# Patient Record
Sex: MALE | Race: WHITE | Employment: FULL TIME | URBAN - METROPOLITAN AREA
[De-identification: names, ages, dates, MRNs, and addresses within clinical notes are randomized per-mention and may not be internally consistent; named-entity substitution may affect disease eponyms.]

---

## 2019-03-26 LAB
CHOLESTEROL, TOTAL: 175 MG/DL
CHOLESTEROL/HDL RATIO: ABNORMAL
HDLC SERPL-MCNC: 40 MG/DL (ref 35–70)
HIV AG/AB: NORMAL
LDL CHOLESTEROL CALCULATED: 115 MG/DL (ref 0–160)
NONHDLC SERPL-MCNC: ABNORMAL MG/DL
TRIGL SERPL-MCNC: 102 MG/DL
VLDLC SERPL CALC-MCNC: 20 MG/DL

## 2022-02-24 ENCOUNTER — HOSPITAL ENCOUNTER (OUTPATIENT)
Dept: GENERAL RADIOLOGY | Age: 62
Discharge: HOME | End: 2022-02-24
Payer: MEDICAID

## 2022-02-24 ENCOUNTER — OFFICE VISIT (OUTPATIENT)
Dept: FAMILY MEDICINE CLINIC | Age: 62
End: 2022-02-24
Payer: MEDICAID

## 2022-02-24 ENCOUNTER — HOSPITAL ENCOUNTER (OUTPATIENT)
Age: 62
Discharge: HOME | End: 2022-02-24
Payer: MEDICAID

## 2022-02-24 VITALS
OXYGEN SATURATION: 95 % | SYSTOLIC BLOOD PRESSURE: 112 MMHG | BODY MASS INDEX: 21.1 KG/M2 | WEIGHT: 150.7 LBS | HEIGHT: 71 IN | DIASTOLIC BLOOD PRESSURE: 62 MMHG | HEART RATE: 85 BPM

## 2022-02-24 DIAGNOSIS — Z11.59 NEED FOR HEPATITIS C SCREENING TEST: ICD-10-CM

## 2022-02-24 DIAGNOSIS — M25.532 LEFT WRIST PAIN: ICD-10-CM

## 2022-02-24 DIAGNOSIS — G47.00 INSOMNIA, UNSPECIFIED TYPE: ICD-10-CM

## 2022-02-24 DIAGNOSIS — F17.210 SMOKING GREATER THAN 40 PACK YEARS: ICD-10-CM

## 2022-02-24 DIAGNOSIS — G25.81 RESTLESS LEGS SYNDROME (RLS): ICD-10-CM

## 2022-02-24 DIAGNOSIS — Z76.89 ENCOUNTER TO ESTABLISH CARE: Primary | ICD-10-CM

## 2022-02-24 DIAGNOSIS — Z13.220 LIPID SCREENING: ICD-10-CM

## 2022-02-24 DIAGNOSIS — G89.29 CHRONIC LOW BACK PAIN WITHOUT SCIATICA, UNSPECIFIED BACK PAIN LATERALITY: ICD-10-CM

## 2022-02-24 DIAGNOSIS — Z11.4 SCREENING FOR HUMAN IMMUNODEFICIENCY VIRUS: ICD-10-CM

## 2022-02-24 DIAGNOSIS — M54.50 CHRONIC LOW BACK PAIN WITHOUT SCIATICA, UNSPECIFIED BACK PAIN LATERALITY: ICD-10-CM

## 2022-02-24 DIAGNOSIS — F19.11 HISTORY OF DRUG ABUSE IN REMISSION (HCC): ICD-10-CM

## 2022-02-24 PROCEDURE — G8427 DOCREV CUR MEDS BY ELIG CLIN: HCPCS | Performed by: PHYSICIAN ASSISTANT

## 2022-02-24 PROCEDURE — 4004F PT TOBACCO SCREEN RCVD TLK: CPT | Performed by: PHYSICIAN ASSISTANT

## 2022-02-24 PROCEDURE — 73110 X-RAY EXAM OF WRIST: CPT

## 2022-02-24 PROCEDURE — 3017F COLORECTAL CA SCREEN DOC REV: CPT | Performed by: PHYSICIAN ASSISTANT

## 2022-02-24 PROCEDURE — G8420 CALC BMI NORM PARAMETERS: HCPCS | Performed by: PHYSICIAN ASSISTANT

## 2022-02-24 PROCEDURE — G8484 FLU IMMUNIZE NO ADMIN: HCPCS | Performed by: PHYSICIAN ASSISTANT

## 2022-02-24 PROCEDURE — 99204 OFFICE O/P NEW MOD 45 MIN: CPT | Performed by: PHYSICIAN ASSISTANT

## 2022-02-24 RX ORDER — ROPINIROLE 0.5 MG/1
TABLET, FILM COATED ORAL
Qty: 60 TABLET | Refills: 0 | Status: SHIPPED | OUTPATIENT
Start: 2022-02-24 | End: 2022-03-21 | Stop reason: SDUPTHER

## 2022-02-24 RX ORDER — BACLOFEN 5 MG/1
TABLET ORAL
Qty: 30 TABLET | Refills: 1 | Status: SHIPPED | OUTPATIENT
Start: 2022-02-24 | End: 2022-03-21 | Stop reason: SDUPTHER

## 2022-02-24 RX ORDER — NAPROXEN SODIUM 220 MG
220 TABLET ORAL 2 TIMES DAILY WITH MEALS
COMMUNITY

## 2022-02-24 ASSESSMENT — PATIENT HEALTH QUESTIONNAIRE - PHQ9
SUM OF ALL RESPONSES TO PHQ9 QUESTIONS 1 & 2: 0
SUM OF ALL RESPONSES TO PHQ QUESTIONS 1-9: 0
2. FEELING DOWN, DEPRESSED OR HOPELESS: 0
1. LITTLE INTEREST OR PLEASURE IN DOING THINGS: 0

## 2022-02-24 NOTE — PROGRESS NOTES
2/24/2022    Franca Jg    Chief Complaint   Patient presents with   Alvia Brunner Doctor       HPI  History obtained from the patient. Angie Booth is a 64 y.o. male who presents today for establishment as a new patient. The patient's last PCP was Dr. Renan Bertrand in [place]. Patient moved to [facility] from [place] about 1 month ago (January 2022). He moved in with his daughter. Medical history is significant for broken ribs last year and he broke ribs about a year before that. Surgical history significant for tonsillectomy. Patient is self-employed as a . He does a lot of painting. Patient is a current smoker, 1 ppd X 46 years, started at age 13. Patient admits alcohol use, \"maybe 5 beers per week, I don't drink to get omebriated anymore. \" Patient admits sexual activity. Patient admits \"25 years of smoking crack cocaine. And I retired about 1 month ago. I feel better. [place]'s better. Now I feel the pain, though. ... Used to do acid, speed. 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 \" Family history significant for father passed away at 58 from lung cancer, mother is healthy. No colon cancer in his family. NKDA. Patient sees the following specialists: None. The patient's pharmacy is [facility].    History of Drug Abuse - Patient stopped doing crack 1 month ago. He had been consistently doing drugs for 25 years. He used to do acid and speed, as well. Chronic Pain - Patient complains of pain in his left wrist X many years. He injured it 30 years ago. Patient admits 20+ years of back pain. Pain is worst by the end of the day after he's worked long hours. Restless Leg Syndrome - Patient was treated for this before with an effective medication, but cannot remember the name of this. Patient also admits trouble falling asleep. He only sleeps about 3 hours per night. He drinks 3 cups of coffee in the morning and 3 cans of Mt. Dew daily.      Health Maintenance - Patient's care gaps include COLON CA SCREEN, LUNG CANCER SCREEN. Patient refuses cancer screenings, stating that he wouldn't want to know if he had cancer. REVIEW OF SYMPTOMS  Review of Systems   Constitutional: Negative for chills and fever. Respiratory: Negative for cough and shortness of breath. Gastrointestinal: Negative for diarrhea and vomiting. PAST MEDICAL HISTORY  History reviewed. No pertinent past medical history. FAMILY HISTORY  History reviewed. No pertinent family history. SOCIAL HISTORY  Social History     Socioeconomic History    Marital status:      Spouse name: None    Number of children: None    Years of education: None    Highest education level: None   Occupational History    None   Tobacco Use    Smoking status: Current Every Day Smoker     Packs/day: 1.00     Years: 46.00     Pack years: 46.00     Types: Cigarettes     Start date: 11/14/1975    Smokeless tobacco: Never Used    Tobacco comment: Patient started smoking at age 13   Substance and Sexual Activity    Alcohol use: Yes     Alcohol/week: 1.0 - 2.0 standard drink     Types: 1 - 2 Cans of beer per week    Drug use: Not Currently     Types: Cocaine     Comment: pt reports stopped smoking crack 1 month ago    Sexual activity: None   Other Topics Concern    None   Social History Narrative    None     Social Determinants of Health     Financial Resource Strain:     Difficulty of Paying Living Expenses: Not on file   Food Insecurity:     Worried About Running Out of Food in the Last Year: Not on file    Nemesio of Food in the Last Year: Not on file   Transportation Needs:     Lack of Transportation (Medical): Not on file    Lack of Transportation (Non-Medical):  Not on file   Physical Activity:     Days of Exercise per Week: Not on file    Minutes of Exercise per Session: Not on file   Stress:     Feeling of Stress : Not on file   Social Connections:     Frequency of Communication with Friends and Family: Not on file    Frequency of Social Gatherings with Friends and Family: Not on file    Attends Synagogue Services: Not on file    Active Member of Clubs or Organizations: Not on file    Attends Club or Organization Meetings: Not on file    Marital Status: Not on file   Intimate Partner Violence:     Fear of Current or Ex-Partner: Not on file    Emotionally Abused: Not on file    Physically Abused: Not on file    Sexually Abused: Not on file   Housing Stability:     Unable to Pay for Housing in the Last Year: Not on file    Number of [place] in the Last Year: Not on file    Unstable Housing in the Last Year: Not on file        SURGICAL HISTORY  History reviewed. No pertinent surgical history. CURRENT MEDICATIONS  Current Outpatient Medications   Medication Sig Dispense Refill    naproxen sodium (ALEVE) 220 MG tablet Take 220 mg by mouth 2 times daily (with meals)      rOPINIRole (REQUIP) 0.5 MG tablet Take half tab nightly 1-3 hours before bedtime X 2 days. Then take 1 tab nightly X 7 days. Then take 2 tablets nightly. 60 tablet 0    Baclofen (LIORESAL) 5 MG tablet Take 1 or 2 tablets by mouth up to three times daily as needed for back pain. 30 tablet 1     No current facility-administered medications for this visit. ALLERGIES  No Known Allergies    RECENT LABS    No results found for: LABA1C  No results found for: EAG    No results found for: CHOL  No results found for: LDLCHOLESTEROL, LDLCALC    No results found for: WBC, HGB, HCT, MCV, PLT    PHYSICAL EXAM  /62   Pulse 85   Ht 5' 11\" (1.803 m)   Wt 150 lb 11.2 oz (68.4 kg)   SpO2 95%   BMI 21.02 kg/m²     Physical Exam  Constitutional:       Appearance: Normal appearance. HENT:      Head: Normocephalic and atraumatic. Eyes:      Comments: EOM grossly intact. Cardiovascular:      Rate and Rhythm: Normal rate and regular rhythm. Heart sounds: No murmur heard. No friction rub. No gallop.     Pulmonary:      Effort: Pulmonary effort is normal. Breath sounds: Normal breath sounds. No wheezing, rhonchi or rales. Musculoskeletal:      Comments: Patient has a mildly tender nodule of the dorsal aspect of his left wrist.    Skin:     General: Skin is warm and dry. Neurological:      Mental Status: He is alert and oriented to person, place, and time. Comments: Cranial nerves II-XII grossly intact   Psychiatric:         Mood and Affect: Mood normal.         Behavior: Behavior normal.         ASSESSMENT & PLAN  1. Encounter to establish care  - Comprehensive Metabolic Panel; Future  - CBC with Auto Differential; Future    2. History of drug abuse in remission [facility])  Recommended local resources such as [facility], but patient declined these. - Comprehensive Metabolic Panel; Future  - CBC with Auto Differential; Future  - Hepatitis C Antibody; Future  - HIV Screen; Future  - TSH with Reflex to FT4; Future    3. Chronic low back pain without sciatica, unspecified back pain laterality  Patient requests a prescription for pain medication, stating that ibuprofen is not covered anymore. Prescribed baclofen and referred the patient to pain management for evaluation.  - Amb External Referral To Pain Medicine  - Baclofen (LIORESAL) 5 MG tablet; Take 1 or 2 tablets by mouth up to three times daily as needed for back pain. Dispense: 30 tablet; Refill: 1    4. Left wrist pain  We will check x-ray and uric acid level. Referred the patient to orthopedics for evaluation.  - Uric Acid; Future  - XR WRIST LEFT (MIN 3 VIEWS); Future  - [place] MD, Orthopedic Surgery, [facility]    5. Restless legs syndrome (RLS)  Started the patient on Requip. Will check iron levels and blood work. - Comprehensive Metabolic Panel; Future  - CBC with Auto Differential; Future  - TSH with Reflex to FT4; Future  - rOPINIRole (REQUIP) 0.5 MG tablet; Take half tab nightly 1-3 hours before bedtime X 2 days. Then take 1 tab nightly X 7 days.  Then take 2 tablets nightly. Dispense: 60 tablet; Refill: 0  - Iron and TIBC; Future  - Ferritin; Future    6. Insomnia, unspecified type  We will address restless leg syndrome first to see if we can get control of insomnia by treating this. - Comprehensive Metabolic Panel; Future  - CBC with Auto Differential; Future  - TSH with Reflex to FT4; Future    7. Lipid screening  Health maintenance requirement. Patient is agreeable to screening.   - Lipid Panel; Future    8. Smoking greater than 40 pack years  Patient refuses lung cancer screening.  - Comprehensive Metabolic Panel; Future  - CBC with Auto Differential; Future    9. Need for hepatitis C screening test  Health maintenance requirement. Patient is agreeable to screening.   - Hepatitis C Antibody; Future    10. Screening for human immunodeficiency virus  Health maintenance requirement. Patient is agreeable to screening.   - HIV Screen; Future          Return in about 6 weeks (around 4/7/2022).             Electronically signed by Navya Kam PA-C on 2/24/2022

## 2022-02-28 NOTE — RESULT ENCOUNTER NOTE
Please call the patient and let them know that I reviewed the results of his wrist x-ray. It showed dislocation of 2 of the small bones in his wrist, the scaphoid and the lunate. We will see what orthopedics has to say.   If he has not already received a call from their office to get scheduled for an appointment, he can go ahead and call the number listed below to schedule this:    MD Armando   [address], [address], [address]   [phone]

## 2022-03-21 ENCOUNTER — OFFICE VISIT (OUTPATIENT)
Dept: FAMILY MEDICINE CLINIC | Age: 62
End: 2022-03-21
Payer: MEDICAID

## 2022-03-21 VITALS
BODY MASS INDEX: 21.99 KG/M2 | HEIGHT: 71 IN | OXYGEN SATURATION: 94 % | SYSTOLIC BLOOD PRESSURE: 122 MMHG | WEIGHT: 157.1 LBS | HEART RATE: 98 BPM | DIASTOLIC BLOOD PRESSURE: 60 MMHG

## 2022-03-21 DIAGNOSIS — H91.93 DECREASED HEARING OF BOTH EARS: ICD-10-CM

## 2022-03-21 DIAGNOSIS — L98.9 SKIN LESION OF SCALP: ICD-10-CM

## 2022-03-21 DIAGNOSIS — H61.23 BILATERAL IMPACTED CERUMEN: ICD-10-CM

## 2022-03-21 DIAGNOSIS — M54.50 CHRONIC LOW BACK PAIN WITHOUT SCIATICA, UNSPECIFIED BACK PAIN LATERALITY: ICD-10-CM

## 2022-03-21 DIAGNOSIS — G25.81 RESTLESS LEGS SYNDROME (RLS): ICD-10-CM

## 2022-03-21 DIAGNOSIS — G89.29 CHRONIC LOW BACK PAIN WITHOUT SCIATICA, UNSPECIFIED BACK PAIN LATERALITY: ICD-10-CM

## 2022-03-21 DIAGNOSIS — G47.00 INSOMNIA, UNSPECIFIED TYPE: Primary | ICD-10-CM

## 2022-03-21 PROCEDURE — 4004F PT TOBACCO SCREEN RCVD TLK: CPT | Performed by: PHYSICIAN ASSISTANT

## 2022-03-21 PROCEDURE — 99214 OFFICE O/P EST MOD 30 MIN: CPT | Performed by: PHYSICIAN ASSISTANT

## 2022-03-21 PROCEDURE — G8420 CALC BMI NORM PARAMETERS: HCPCS | Performed by: PHYSICIAN ASSISTANT

## 2022-03-21 PROCEDURE — G8427 DOCREV CUR MEDS BY ELIG CLIN: HCPCS | Performed by: PHYSICIAN ASSISTANT

## 2022-03-21 PROCEDURE — G8484 FLU IMMUNIZE NO ADMIN: HCPCS | Performed by: PHYSICIAN ASSISTANT

## 2022-03-21 PROCEDURE — 3017F COLORECTAL CA SCREEN DOC REV: CPT | Performed by: PHYSICIAN ASSISTANT

## 2022-03-21 RX ORDER — TRAZODONE HYDROCHLORIDE 50 MG/1
TABLET ORAL
Qty: 30 TABLET | Refills: 1 | Status: SHIPPED | OUTPATIENT
Start: 2022-03-21

## 2022-03-21 RX ORDER — ROPINIROLE 1 MG/1
TABLET, FILM COATED ORAL
Qty: 53 TABLET | Refills: 0 | Status: SHIPPED | OUTPATIENT
Start: 2022-03-21 | End: 2022-04-18

## 2022-03-21 RX ORDER — BACLOFEN 5 MG/1
TABLET ORAL
Qty: 30 TABLET | Refills: 1 | Status: SHIPPED | OUTPATIENT
Start: 2022-03-21

## 2022-03-21 NOTE — PROGRESS NOTES
3/21/2022    Yue Diamond    Chief Complaint   Patient presents with    Follow-up     lower back pain ongoing     Insomnia     pt states trouble falling asleep and staying asleep    Other     restless leg syndrome , pt wants to know if med can be increased    Hearing Problem     pt wants to know who he needs to see about getting hearing aids       HPI  History obtained from the patient. Camila Gay is a 64 y.o. male who presents today for one month follow up. Insomnia - Patient complains of insomnia. He states that he sometimes only gets 4 hours or less of sleep per night. He states that he lays awake with racing thoughts and has trouble falling asleep. Restless leg syndrome - Patient has seen improvement in his restless leg syndrome with the Requip, but he feels the dose is not strong enough. He is still struggling with restless leg symptoms    Decreased Hearing - started a long time ago, worked around a lot of loud machinery and equipment. Getting worse recently, has to listen to the tv loudly, and had trouble hearing grandkids. Dad had a history of hearing problems, would like to get some hearing aids to wear when watching tv and with family. Chronic Back Pain - Patient notes that he did experience some relief with the baclofen, and he requests a refill of this. Patient states that he does not want to see pain management. He states he will, \"deal with it. \"     Hx of drug abuse - Patient stopped doing crack in January, check how progress is going. current smoker 1 ppd x 45 years. Would be open to quitting but wants to try on his own without NRT or medication. Skin Lesion - Patient notes a lesion on his scalp which does not hurt but has been growing bigger and bigger recently. REVIEW OF SYMPTOMS  Review of Systems   Constitutional: Negative for chills and fever. Respiratory: Negative for cough and shortness of breath. Gastrointestinal: Negative for diarrhea and vomiting.      PAST MEDICAL HISTORY  No past medical history on file. FAMILY HISTORY  No family history on file. SOCIAL HISTORY  Social History     Socioeconomic History    Marital status:      Spouse name: Not on file    Number of children: Not on file    Years of education: Not on file    Highest education level: Not on file   Occupational History    Not on file   Tobacco Use    Smoking status: Current Every Day Smoker     Packs/day: 1.00     Years: 46.00     Pack years: 46.00     Types: Cigarettes     Start date: 11/14/1975    Smokeless tobacco: Never Used    Tobacco comment: Patient started smoking at age 13   Substance and Sexual Activity    Alcohol use: Yes     Alcohol/week: 1.0 - 2.0 standard drink     Types: 1 - 2 Cans of beer per week    Drug use: Not Currently     Types: Cocaine     Comment: pt reports stopped smoking crack 1 month ago    Sexual activity: Not on file   Other Topics Concern    Not on file   Social History Narrative    Not on file     Social Determinants of Health     Financial Resource Strain:     Difficulty of Paying Living Expenses: Not on file   Food Insecurity:     Worried About Running Out of Food in the Last Year: Not on file    Nemesio of Food in the Last Year: Not on file   Transportation Needs:     Lack of Transportation (Medical): Not on file    Lack of Transportation (Non-Medical):  Not on file   Physical Activity:     Days of Exercise per Week: Not on file    Minutes of Exercise per Session: Not on file   Stress:     Feeling of Stress : Not on file   Social Connections:     Frequency of Communication with Friends and Family: Not on file    Frequency of Social Gatherings with Friends and Family: Not on file    Attends Catholic Services: Not on file    Active Member of Clubs or Organizations: Not on file    Attends Club or Organization Meetings: Not on file    Marital Status: Not on file   Intimate Partner Violence:     Fear of Current or Ex-Partner: Not on file    Emotionally Abused: Not on file    Physically Abused: Not on file    Sexually Abused: Not on file   Housing Stability:     Unable to Pay for Housing in the Last Year: Not on file    Number of Places Lived in the Last Year: Not on file    Unstable Housing in the Last Year: Not on file        SURGICAL HISTORY  No past surgical history on file. CURRENT MEDICATIONS  Current Outpatient Medications   Medication Sig Dispense Refill    Baclofen (LIORESAL) 5 MG tablet Take 1 or 2 tablets by mouth up to three times daily as needed for back pain. 30 tablet 1    traZODone (DESYREL) 50 MG tablet Take 1 tablet nightly at bedtime. Try this for 2 weeks. Then if necessary, may take 2 tablets nightly. 30 tablet 1    rOPINIRole (REQUIP) 1 MG tablet Take 1.5 tablets by mouth nightly for 7 days, THEN 2 tablets nightly for 21 days. Take half tab nightly 1-3 hours before bedtime X 2 days. Then take 1 tab nightly X 7 days. Then take 2 tablets nightly. . 53 tablet 0    naproxen sodium (ALEVE) 220 MG tablet Take 220 mg by mouth 2 times daily (with meals) (Patient not taking: Reported on 3/21/2022)       No current facility-administered medications for this visit. ALLERGIES  No Known Allergies    RECENT LABS    No results found for: LABA1C  No results found for: EAG    No results found for: CHOL  No results found for: LDLCHOLESTEROL, LDLCALC    No results found for: WBC, HGB, HCT, MCV, PLT    PHYSICAL EXAM  /60   Pulse 98   Ht 5' 11\" (1.803 m)   Wt 157 lb 1.6 oz (71.3 kg)   SpO2 94%   BMI 21.91 kg/m²     Physical Exam  Constitutional:       Appearance: Normal appearance. HENT:      Head: Normocephalic and atraumatic. Right Ear: There is impacted cerumen. Left Ear: There is impacted cerumen. Eyes:      Comments: EOM grossly intact. Cardiovascular:      Rate and Rhythm: Normal rate and regular rhythm. Heart sounds: No murmur heard. No friction rub. No gallop.     Pulmonary:      Effort: 1    7. Skin lesion of scalp  Refer the patient to dermatology for removal.  - Amb External Referral To Dermatology      Return in about 4 weeks (around 4/18/2022).             Electronically signed by Jared Combs PA-C on 3/21/2022

## 2022-05-02 DIAGNOSIS — G47.00 INSOMNIA, UNSPECIFIED TYPE: ICD-10-CM

## 2022-05-02 RX ORDER — TRAZODONE HYDROCHLORIDE 50 MG/1
TABLET ORAL
Qty: 30 TABLET | Refills: 1 | OUTPATIENT
Start: 2022-05-02

## 2022-09-07 ENCOUNTER — COMMUNITY OUTREACH (OUTPATIENT)
Dept: FAMILY MEDICINE CLINIC | Age: 62
End: 2022-09-07

## 2022-09-07 NOTE — PROGRESS NOTES
Patient's HM shows they are overdue for HIV Screen, Hep C Screen and Colorectal Screening. Care Everywhere and  files searched. HM updated with HIV Screen. Pt declined colonoscopy.

## 2022-12-16 DIAGNOSIS — G25.81 RESTLESS LEGS SYNDROME (RLS): ICD-10-CM

## 2022-12-16 DIAGNOSIS — M54.50 CHRONIC LOW BACK PAIN WITHOUT SCIATICA, UNSPECIFIED BACK PAIN LATERALITY: ICD-10-CM

## 2022-12-16 DIAGNOSIS — G47.00 INSOMNIA, UNSPECIFIED TYPE: ICD-10-CM

## 2022-12-16 DIAGNOSIS — G89.29 CHRONIC LOW BACK PAIN WITHOUT SCIATICA, UNSPECIFIED BACK PAIN LATERALITY: ICD-10-CM

## 2022-12-19 ENCOUNTER — TELEPHONE (OUTPATIENT)
Dept: FAMILY MEDICINE CLINIC | Age: 62
End: 2022-12-19

## 2022-12-19 DIAGNOSIS — G25.81 RESTLESS LEGS SYNDROME (RLS): ICD-10-CM

## 2022-12-19 RX ORDER — ROPINIROLE 1 MG/1
2 TABLET, FILM COATED ORAL NIGHTLY
Qty: 180 TABLET | Refills: 0 | Status: SHIPPED | OUTPATIENT
Start: 2022-12-19 | End: 2023-03-19

## 2022-12-19 RX ORDER — ROPINIROLE 1 MG/1
TABLET, FILM COATED ORAL
Qty: 53 TABLET | Refills: 0 | Status: SHIPPED | OUTPATIENT
Start: 2022-12-19 | End: 2022-12-19 | Stop reason: SDUPTHER

## 2022-12-19 RX ORDER — BACLOFEN 5 MG/1
TABLET ORAL
Qty: 30 TABLET | Refills: 1 | Status: SHIPPED | OUTPATIENT
Start: 2022-12-19

## 2022-12-19 RX ORDER — TRAZODONE HYDROCHLORIDE 50 MG/1
TABLET ORAL
Qty: 30 TABLET | Refills: 1 | Status: SHIPPED | OUTPATIENT
Start: 2022-12-19

## 2022-12-19 NOTE — TELEPHONE ENCOUNTER
Requested Prescriptions     Signed Prescriptions Disp Refills    rOPINIRole (REQUIP) 1 MG tablet 180 tablet 0     Sig: Take 2 tablets by mouth nightly     Authorizing Provider: Jess Pacheco     Ordering User: Marc Blum

## 2023-02-17 DIAGNOSIS — G47.00 INSOMNIA, UNSPECIFIED TYPE: ICD-10-CM

## 2023-02-17 RX ORDER — TRAZODONE HYDROCHLORIDE 50 MG/1
TABLET ORAL
Qty: 60 TABLET | Refills: 1 | Status: SHIPPED | OUTPATIENT
Start: 2023-02-17

## 2023-02-20 ENCOUNTER — OFFICE VISIT (OUTPATIENT)
Dept: FAMILY MEDICINE CLINIC | Age: 63
End: 2023-02-20
Payer: MEDICAID

## 2023-02-20 VITALS
BODY MASS INDEX: 21.42 KG/M2 | DIASTOLIC BLOOD PRESSURE: 62 MMHG | OXYGEN SATURATION: 96 % | HEIGHT: 71 IN | WEIGHT: 153 LBS | HEART RATE: 76 BPM | SYSTOLIC BLOOD PRESSURE: 100 MMHG

## 2023-02-20 DIAGNOSIS — M25.512 CHRONIC PAIN OF BOTH SHOULDERS: Primary | ICD-10-CM

## 2023-02-20 DIAGNOSIS — R20.0 NUMBNESS AND TINGLING IN BOTH HANDS: ICD-10-CM

## 2023-02-20 DIAGNOSIS — L98.9 SKIN LESION OF SCALP: ICD-10-CM

## 2023-02-20 DIAGNOSIS — M54.50 ACUTE BILATERAL LOW BACK PAIN WITHOUT SCIATICA: ICD-10-CM

## 2023-02-20 DIAGNOSIS — G89.29 CHRONIC PAIN OF BOTH SHOULDERS: Primary | ICD-10-CM

## 2023-02-20 DIAGNOSIS — G47.00 INSOMNIA, UNSPECIFIED TYPE: ICD-10-CM

## 2023-02-20 DIAGNOSIS — M25.511 CHRONIC PAIN OF BOTH SHOULDERS: Primary | ICD-10-CM

## 2023-02-20 DIAGNOSIS — R20.2 NUMBNESS AND TINGLING IN BOTH HANDS: ICD-10-CM

## 2023-02-20 PROBLEM — G56.02 CARPAL TUNNEL SYNDROME OF LEFT WRIST: Status: ACTIVE | Noted: 2021-06-18

## 2023-02-20 PROBLEM — H90.3 SENSORINEURAL HEARING LOSS (SNHL) OF BOTH EARS: Status: ACTIVE | Noted: 2021-06-18

## 2023-02-20 PROBLEM — S14.3XXS: Status: ACTIVE | Noted: 2021-06-18

## 2023-02-20 PROBLEM — S22.31XD CLOSED FRACTURE OF ONE RIB OF RIGHT SIDE WITH ROUTINE HEALING: Status: ACTIVE | Noted: 2021-06-18

## 2023-02-20 PROCEDURE — G8427 DOCREV CUR MEDS BY ELIG CLIN: HCPCS

## 2023-02-20 PROCEDURE — G8484 FLU IMMUNIZE NO ADMIN: HCPCS

## 2023-02-20 PROCEDURE — G8420 CALC BMI NORM PARAMETERS: HCPCS

## 2023-02-20 PROCEDURE — 3017F COLORECTAL CA SCREEN DOC REV: CPT

## 2023-02-20 PROCEDURE — 4004F PT TOBACCO SCREEN RCVD TLK: CPT

## 2023-02-20 PROCEDURE — 99214 OFFICE O/P EST MOD 30 MIN: CPT

## 2023-02-20 RX ORDER — METHOCARBAMOL 500 MG/1
500 TABLET, FILM COATED ORAL 4 TIMES DAILY
Qty: 80 TABLET | Refills: 1 | Status: SHIPPED | OUTPATIENT
Start: 2023-02-20 | End: 2023-04-01

## 2023-02-20 RX ORDER — METHYLPREDNISOLONE 4 MG/1
TABLET ORAL
Qty: 1 KIT | Refills: 0 | Status: SHIPPED | OUTPATIENT
Start: 2023-02-20

## 2023-02-20 RX ORDER — TRAZODONE HYDROCHLORIDE 100 MG/1
200 TABLET ORAL NIGHTLY
Qty: 90 TABLET | Refills: 1 | Status: SHIPPED | OUTPATIENT
Start: 2023-02-20

## 2023-02-20 ASSESSMENT — PATIENT HEALTH QUESTIONNAIRE - PHQ9
SUM OF ALL RESPONSES TO PHQ QUESTIONS 1-9: 0
SUM OF ALL RESPONSES TO PHQ9 QUESTIONS 1 & 2: 0
SUM OF ALL RESPONSES TO PHQ QUESTIONS 1-9: 0
2. FEELING DOWN, DEPRESSED OR HOPELESS: 0
1. LITTLE INTEREST OR PLEASURE IN DOING THINGS: 0
SUM OF ALL RESPONSES TO PHQ QUESTIONS 1-9: 0
SUM OF ALL RESPONSES TO PHQ QUESTIONS 1-9: 0

## 2023-02-20 ASSESSMENT — ENCOUNTER SYMPTOMS
BACK PAIN: 1
SHORTNESS OF BREATH: 0

## 2023-02-20 NOTE — PROGRESS NOTES
2/20/2023    Alver Dura    Chief Complaint   Patient presents with    Hip Pain     - bilateral hip pain, more so left hip pain. Chronic & getting worse  - numbness bilateral arms & fingers. Chronic & getting worse. - bilateral shoulder pain while raising arms. Chronic & getting worse. Pt has a history bilateral shoulder injuries. Pt currently takes aleve which helps shoulder pain temporarily. Doesn't help hip pain. HPI  History was obtained from patient. May Neville is a pleasant 58 y.o. male who presents today for concerns of worsening pain in several areas. Low back pain: chronic/worsening. L>R. Bilateral SI pain. Worsening since last year. Denies numbness and tingling down his BLE. Working and walking make the pain worse- rest does help. Bilateral Shoulder Pain: Chronic/worsening. Worse when lifting arms overhead. He sleeps on his stomach with his arms folded underneath of his head. History of several injuries. 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 Bilateral arm/hand numbness: chronic/worsening. History of left carpal tunnel syndrome. Small fingers are the worst- these are numb while he is sitting in the exam chair. He is a  and works as a local . 1. Chronic pain of both shoulders    2. Acute bilateral low back pain without sciatica    3. Numbness and tingling in both hands    4. Insomnia, unspecified type    5. Skin lesion of scalp             REVIEW OF SYMPTOMS    Review of Systems   Respiratory:  Negative for shortness of breath. Cardiovascular:  Negative for chest pain and palpitations. Musculoskeletal:  Positive for arthralgias (Bilateral shoulders), back pain (Chronic- low back) and myalgias (Bilateral forearms into hands with neuropathic symptoms). Psychiatric/Behavioral:  Positive for sleep disturbance. PAST MEDICAL HISTORY  History reviewed. No pertinent past medical history. FAMILY HISTORY  History reviewed.  No pertinent family history. SOCIAL HISTORY  Social History     Socioeconomic History    Marital status:      Spouse name: None    Number of children: None    Years of education: None    Highest education level: None   Tobacco Use    Smoking status: Every Day     Packs/day: 1.00     Years: 46.00     Pack years: 46.00     Types: Cigarettes     Start date: 11/14/1975    Smokeless tobacco: Never    Tobacco comments:     Patient started smoking at age 13   Substance and Sexual Activity    Alcohol use: Yes     Alcohol/week: 1.0 - 2.0 standard drink     Types: 1 - 2 Cans of beer per week    Drug use: Not Currently     Types: Cocaine     Comment: pt reports stopped smoking crack 1 month ago        SURGICAL HISTORY  History reviewed. No pertinent surgical history. CURRENT MEDICATIONS  Current Outpatient Medications   Medication Sig Dispense Refill    methocarbamol (ROBAXIN) 500 MG tablet Take 1 tablet by mouth 4 times daily 80 tablet 1    traZODone (DESYREL) 100 MG tablet Take 2 tablets by mouth nightly Take 2 tablets nightly at bedtime. 90 tablet 1    methylPREDNISolone (MEDROL DOSEPACK) 4 MG tablet Take by mouth. 1 kit 0    rOPINIRole (REQUIP) 1 MG tablet Take 2 tablets by mouth nightly 180 tablet 0    naproxen sodium (ANAPROX) 220 MG tablet Take 220 mg by mouth 2 times daily (with meals)       No current facility-administered medications for this visit. ALLERGIES  No Known Allergies    PHYSICAL EXAM    /62 (Site: Right Upper Arm, Position: Sitting, Cuff Size: Small Adult)   Pulse 76   Ht 5' 11\" (1.803 m)   Wt 153 lb (69.4 kg)   SpO2 96%   BMI 21.34 kg/m²     Physical Exam  Vitals and nursing note reviewed. Constitutional:       General: He is not in acute distress. Appearance: Normal appearance. He is normal weight. HENT:      Head: Normocephalic and atraumatic.    Eyes:      Conjunctiva/sclera: Conjunctivae normal.   Pulmonary:      Effort: Pulmonary effort is normal. Musculoskeletal:      Right shoulder: Tenderness and bony tenderness present. Decreased range of motion. Normal strength. Left shoulder: Tenderness and bony tenderness present. Decreased range of motion. Normal strength. Right elbow: Normal range of motion. Tenderness present in medial epicondyle and lateral epicondyle. Left elbow: Normal range of motion. Tenderness present in medial epicondyle and lateral epicondyle. Right forearm: Tenderness present. Left forearm: Tenderness present. Right hand: Decreased strength. Left hand: Decreased strength. Arms:       Lumbar back: Spasms, tenderness and bony tenderness present. Decreased range of motion. Back:       Comments: (+) Tinel, Phalen testing bilaterally. (+) painful arc, resisted shoulder flexion, kristy brandt bilaterally. Neurological:      Mental Status: He is alert and oriented to person, place, and time. Mental status is at baseline. ASSESSMENT & PLAN    Mayur Rosas was seen today for hip pain. Diagnoses and all orders for this visit:    Chronic pain of both shoulders  Will obtain xrays to assess for degree of degeneration in shoulder joints. Side effect profile reviewed with patient- discouraged driving and physically risky activities while taking Robaxin. Will trial physical therapy and consider additional imaging if ineffective. -     XR SHOULDER LEFT (MIN 2 VIEWS); Future  -     XR SHOULDER RIGHT (MIN 2 VIEWS); Future  -     methocarbamol (ROBAXIN) 500 MG tablet; Take 1 tablet by mouth 4 times daily  -     [facility]  -     methylPREDNISolone (MEDROL DOSEPACK) 4 MG tablet; Take by mouth. Acute bilateral low back pain without sciatica  As above. -     XR LUMBAR SPINE (2-3 VIEWS); Future  -     methocarbamol (ROBAXIN) 500 MG tablet;  Take 1 tablet by mouth 4 times daily  -     [facility]  -     methylPREDNISolone (MEDROL DOSEPACK) 4 MG tablet; Take by mouth. Numbness and tingling in both hands  As above, will refer for EMG testing and consider ortho referral.   -     Alex Samson MD, Neurology, [place]  -     methocarbamol (ROBAXIN) 500 MG tablet; Take 1 tablet by mouth 4 times daily  -     [facility] - [place]  -     methylPREDNISolone (MEDROL DOSEPACK) 4 MG tablet; Take by mouth. Insomnia, unspecified type  Will trial dose increase as 100mg was ineffective. -     traZODone (DESYREL) 100 MG tablet; Take 2 tablets by mouth nightly Take 2 tablets nightly at bedtime. Skin lesion of scalp  Previous referral did not accept his insurance. -     Amb External Referral To Dermatology    Return in about 6 months (around 8/20/2023).          Electronically signed by POLO Valdivia CNP on 2/20/2023

## 2023-02-20 NOTE — PATIENT INSTRUCTIONS
[facility] Hours  [address] [place], [address] [facility]    Hours  Monday-Friday 7am-5pm  Saturday  8am-12pm      [facility] Neurology - Loletta Goodell, MD  [address] [facility]  [place] [address], [place] [address]  Phone: [phone] [facility]  [address], [place] [address] [phone] SAMIR Spaulding  [facility]  [address]   [place], [address]  Phone - [phone]

## 2023-03-28 DIAGNOSIS — G47.00 INSOMNIA, UNSPECIFIED TYPE: Primary | ICD-10-CM

## 2023-03-28 DIAGNOSIS — G25.81 RESTLESS LEGS SYNDROME (RLS): ICD-10-CM

## 2023-03-28 RX ORDER — ROPINIROLE 1 MG/1
2 TABLET, FILM COATED ORAL NIGHTLY
Qty: 180 TABLET | Refills: 0 | Status: SHIPPED | OUTPATIENT
Start: 2023-03-28 | End: 2023-06-26

## 2023-03-28 RX ORDER — DOXEPIN HYDROCHLORIDE 10 MG/1
10 CAPSULE ORAL NIGHTLY
Qty: 30 CAPSULE | Refills: 1 | Status: SHIPPED | OUTPATIENT
Start: 2023-03-28 | End: 2023-05-27

## 2023-03-28 NOTE — TELEPHONE ENCOUNTER
Please call the patient and let him know I sent an alternative option for him to try called doxepin. If he has dry mouth or any other issues with his medication, please let us know.

## 2023-03-28 NOTE — TELEPHONE ENCOUNTER
2-20-23  JEFFERSON 8-21-23  [facility]     The Trazodone causes very dry mouth is there another medication that is effective as Trazodone with out the dry mouth. Call patient and advise .

## 2023-05-31 DIAGNOSIS — G47.00 INSOMNIA, UNSPECIFIED TYPE: ICD-10-CM

## 2023-05-31 DIAGNOSIS — G25.81 RESTLESS LEGS SYNDROME (RLS): ICD-10-CM

## 2023-05-31 RX ORDER — TRAZODONE HYDROCHLORIDE 100 MG/1
TABLET ORAL
Qty: 90 TABLET | Refills: 0 | OUTPATIENT
Start: 2023-05-31

## 2023-05-31 RX ORDER — ROPINIROLE 1 MG/1
2 TABLET, FILM COATED ORAL NIGHTLY
Qty: 180 TABLET | Refills: 0 | OUTPATIENT
Start: 2023-05-31 | End: 2023-08-29

## 2023-06-01 DIAGNOSIS — G47.00 INSOMNIA, UNSPECIFIED TYPE: ICD-10-CM

## 2023-06-01 DIAGNOSIS — G25.81 RESTLESS LEGS SYNDROME (RLS): ICD-10-CM

## 2023-06-01 RX ORDER — ROPINIROLE 1 MG/1
2 TABLET, FILM COATED ORAL NIGHTLY
Qty: 180 TABLET | Refills: 0 | Status: SHIPPED | OUTPATIENT
Start: 2023-06-01 | End: 2023-08-30

## 2023-06-01 RX ORDER — TRAZODONE HYDROCHLORIDE 100 MG/1
200 TABLET ORAL NIGHTLY
Qty: 180 TABLET | Refills: 0 | Status: SHIPPED | OUTPATIENT
Start: 2023-06-01

## 2023-08-17 DIAGNOSIS — G25.81 RESTLESS LEGS SYNDROME (RLS): ICD-10-CM

## 2023-08-17 DIAGNOSIS — G47.00 INSOMNIA, UNSPECIFIED TYPE: ICD-10-CM

## 2023-08-18 RX ORDER — ROPINIROLE 2 MG/1
2 TABLET, FILM COATED ORAL NIGHTLY
Qty: 90 TABLET | Refills: 1 | Status: SHIPPED | OUTPATIENT
Start: 2023-08-18 | End: 2024-02-14

## 2023-08-18 RX ORDER — TRAZODONE HYDROCHLORIDE 100 MG/1
200 TABLET ORAL NIGHTLY
Qty: 180 TABLET | Refills: 1 | Status: SHIPPED | OUTPATIENT
Start: 2023-08-18 | End: 2024-02-14

## 2023-11-10 DIAGNOSIS — G25.81 RESTLESS LEGS SYNDROME (RLS): ICD-10-CM

## 2023-11-10 NOTE — TELEPHONE ENCOUNTER
Re: rOPINIRole (REQUIP) 2 MG tablet     Patient states he is taking 2 -3 pills per day and now is only has 10 pills left. Could he get a refill of this--he does not want to be without this.

## 2023-11-13 RX ORDER — ROPINIROLE 2 MG/1
2 TABLET, FILM COATED ORAL NIGHTLY
Qty: 90 TABLET | Refills: 1 | Status: SHIPPED | OUTPATIENT
Start: 2023-11-13 | End: 2024-05-11

## 2024-01-04 ENCOUNTER — TELEPHONE (OUTPATIENT)
Dept: FAMILY MEDICINE CLINIC | Age: 64
End: 2024-01-04

## 2024-01-04 NOTE — TELEPHONE ENCOUNTER
Patient said the Ropinirole is not working and needs to speak with someone about someone regarding it.

## 2024-01-05 DIAGNOSIS — G25.81 RESTLESS LEGS SYNDROME (RLS): ICD-10-CM

## 2024-01-05 NOTE — TELEPHONE ENCOUNTER
To Cody-    Patient said the rOPINIRole (REQUIP) 2 MG tablet   Is not really working for the RLS----he has to take at least 3 or 4 pills per day to get his legs calmed down.  He has to rely on his insurance company to provide transportation for him so since they brought him late to the appt, he could not be seen ----his next appt is on 2/5/24------could he have one more month of the Ropinirole called in for 90 pills for one month until he can discuss with you what other medicine he can be prescribed.     Please call him to let him know if you will do this.      [facility] in [place]

## 2024-01-08 NOTE — TELEPHONE ENCOUNTER
It looks like the patient has an appointment scheduled in February to discuss this, so we can talk about options further at that time.  Would he like to try increasing his dose in the meantime? Or if he's wanting to try a different medication, we will wait and discuss that at his appointment

## 2024-01-09 RX ORDER — ROPINIROLE 2 MG/1
2 TABLET, FILM COATED ORAL NIGHTLY
Qty: 30 TABLET | Refills: 1 | Status: SHIPPED | OUTPATIENT
Start: 2024-01-09 | End: 2024-03-09

## 2024-01-09 NOTE — TELEPHONE ENCOUNTER
I was going to ask him about a dose adjustment (see telephone encounter), but I just went ahead and sent in a refill of his current dose. Please call the patient and let him know I refilled the medication.

## 2024-01-09 NOTE — TELEPHONE ENCOUNTER
To Cody-    Patient is calling again to see if you have addressed his medicine for RLS situation.    Please call and let him know what you will do.  He started calling on Friday.

## 2024-01-25 DIAGNOSIS — G47.00 INSOMNIA, UNSPECIFIED TYPE: ICD-10-CM

## 2024-01-25 RX ORDER — TRAZODONE HYDROCHLORIDE 100 MG/1
200 TABLET ORAL NIGHTLY
Qty: 180 TABLET | Refills: 1 | Status: SHIPPED | OUTPATIENT
Start: 2024-01-25 | End: 2024-07-23

## 2024-01-25 NOTE — TELEPHONE ENCOUNTER
----- Message from Jahaira Geena sent at 1/25/2024 11:57 AM EST -----  Subject: Refill Request    QUESTIONS  Name of Medication? traZODone (DESYREL) 100 MG tablet  Patient-reported dosage and instructions? 100mg  How many days do you have left? 1  Preferred Pharmacy? [facility]  Pharmacy phone number (if available)? [phone]  Additional Information for Provider? Pt has a scheduled appointment and   would like to medication that would last him until 2/5.Please reach out in   this concern   ---------------------------------------------------------------------------  --------------  CALL BACK INFO  What is the best way for the office to contact you? Do not leave any   message, patient will call back for answer  Preferred Call Back Phone Number? [phone]  ---------------------------------------------------------------------------  --------------  SCRIPT ANSWERS  Relationship to Patient? Self

## 2024-02-05 ENCOUNTER — OFFICE VISIT (OUTPATIENT)
Dept: FAMILY MEDICINE CLINIC | Age: 64
End: 2024-02-05

## 2024-02-05 VITALS
TEMPERATURE: 98.8 F | DIASTOLIC BLOOD PRESSURE: 60 MMHG | HEART RATE: 99 BPM | BODY MASS INDEX: 20.8 KG/M2 | OXYGEN SATURATION: 90 % | HEIGHT: 71 IN | SYSTOLIC BLOOD PRESSURE: 100 MMHG | WEIGHT: 148.6 LBS

## 2024-02-05 DIAGNOSIS — F31.9 BIPOLAR DEPRESSION (HCC): Primary | ICD-10-CM

## 2024-02-05 DIAGNOSIS — J01.10 ACUTE NON-RECURRENT FRONTAL SINUSITIS: ICD-10-CM

## 2024-02-05 DIAGNOSIS — G25.81 RESTLESS LEGS SYNDROME (RLS): ICD-10-CM

## 2024-02-05 RX ORDER — AMOXICILLIN AND CLAVULANATE POTASSIUM 875; 125 MG/1; MG/1
1 TABLET, FILM COATED ORAL 2 TIMES DAILY
Qty: 20 TABLET | Refills: 0 | Status: SHIPPED | OUTPATIENT
Start: 2024-02-05 | End: 2024-02-05

## 2024-02-05 RX ORDER — AMOXICILLIN AND CLAVULANATE POTASSIUM 875; 125 MG/1; MG/1
1 TABLET, FILM COATED ORAL 2 TIMES DAILY
Qty: 20 TABLET | Refills: 0 | Status: SHIPPED | OUTPATIENT
Start: 2024-02-05 | End: 2024-02-15

## 2024-02-05 RX ORDER — QUETIAPINE FUMARATE 25 MG/1
25 TABLET, FILM COATED ORAL 2 TIMES DAILY
Qty: 60 TABLET | Refills: 3 | Status: SHIPPED | OUTPATIENT
Start: 2024-02-05 | End: 2024-02-05

## 2024-02-05 RX ORDER — ROPINIROLE 4 MG/1
4 TABLET, FILM COATED ORAL NIGHTLY
Qty: 90 TABLET | Refills: 1 | Status: SHIPPED | OUTPATIENT
Start: 2024-02-05 | End: 2024-08-03

## 2024-02-05 RX ORDER — QUETIAPINE FUMARATE 25 MG/1
25 TABLET, FILM COATED ORAL 2 TIMES DAILY
Qty: 60 TABLET | Refills: 3 | Status: SHIPPED | OUTPATIENT
Start: 2024-02-05

## 2024-02-05 RX ORDER — ROPINIROLE 4 MG/1
4 TABLET, FILM COATED ORAL NIGHTLY
Qty: 90 TABLET | Refills: 1 | Status: SHIPPED | OUTPATIENT
Start: 2024-02-05 | End: 2024-02-05

## 2024-02-05 NOTE — PROGRESS NOTES
2/11/2024    Andre Gallegos    Chief Complaint   Patient presents with    Medication Problem     Says medication for RLS is helping but not for long.        HPI  History was obtained from patient.  Andre is a 63 y.o. male with a PMHx as listed below who presents today for follow up on chronic conditions.     Subjective fever for over the past week, chills  Current smoker    Medications are not helping much with. Patient gets roughly 1-2 hours a night.   Patient feels has a lot of thoguhts in head keeping him up. Admits some depression at times    1. Bipolar depression (HCC)    2. Restless legs syndrome (RLS)    3. Acute non-recurrent frontal sinusitis             REVIEW OF SYMPTOMS    Review of Systems   Constitutional:  Negative for chills and fatigue.   HENT:  Negative for congestion and sore throat.    Respiratory:  Negative for shortness of breath and wheezing.    Cardiovascular:  Negative for chest pain and palpitations.   Gastrointestinal:  Negative for abdominal pain and nausea.   Genitourinary:  Negative for frequency and urgency.   Neurological:  Negative for light-headedness.       PAST MEDICAL HISTORY  No past medical history on file.    FAMILY HISTORY  No family history on file.    SOCIAL HISTORY  Social History     Socioeconomic History    Marital status:    Tobacco Use    Smoking status: Every Day     Current packs/day: 1.00     Average packs/day: 1 pack/day for 48.2 years (48.2 ttl pk-yrs)     Types: Cigarettes     Start date: 11/14/1975    Smokeless tobacco: Never    Tobacco comments:     Patient started smoking at age 15   Substance and Sexual Activity    Alcohol use: Yes     Alcohol/week: 1.0 - 2.0 standard drink of alcohol     Types: 1 - 2 Cans of beer per week    Drug use: Not Currently     Types: Cocaine     Comment: pt reports stopped smoking crack 1 month ago        SURGICAL HISTORY  No past surgical history on file.              CURRENT MEDICATIONS  Current Outpatient Medications

## 2024-03-18 ENCOUNTER — TELEPHONE (OUTPATIENT)
Dept: FAMILY MEDICINE CLINIC | Age: 64
End: 2024-03-18

## 2024-03-18 DIAGNOSIS — G25.81 RESTLESS LEGS SYNDROME (RLS): ICD-10-CM

## 2024-03-18 RX ORDER — ROPINIROLE 2 MG/1
2 TABLET, FILM COATED ORAL NIGHTLY
Qty: 90 TABLET | Refills: 1 | Status: SHIPPED | OUTPATIENT
Start: 2024-03-18 | End: 2024-09-14

## 2024-03-18 NOTE — TELEPHONE ENCOUNTER
Patient called and stated that he is out of Ropinirole 4 mg. Patient has been taking 2 a day when needed. Please call patient and advise  [facility]

## 2024-03-18 NOTE — TELEPHONE ENCOUNTER
I sent in some 2 mg tablets for him.  There are some medications that can be taken as needed, but with this one, it's best to take it consistently (every night, not just as needed)

## 2024-05-17 ENCOUNTER — COMMUNITY OUTREACH (OUTPATIENT)
Dept: FAMILY MEDICINE CLINIC | Age: 64
End: 2024-05-17

## 2024-05-17 NOTE — PROGRESS NOTES
Patient's HM shows they are overdue for Labs and Colorectal Screening.   Care Everywhere and  files searched.   updated with Lipid.

## 2024-07-24 DIAGNOSIS — G25.81 RESTLESS LEGS SYNDROME (RLS): ICD-10-CM

## 2024-07-24 DIAGNOSIS — G47.00 INSOMNIA, UNSPECIFIED TYPE: ICD-10-CM

## 2024-07-24 DIAGNOSIS — F31.9 BIPOLAR DEPRESSION (HCC): ICD-10-CM

## 2024-07-24 RX ORDER — ROPINIROLE 2 MG/1
2 TABLET, FILM COATED ORAL NIGHTLY
Qty: 90 TABLET | Refills: 0 | OUTPATIENT
Start: 2024-07-24

## 2024-07-24 RX ORDER — ROPINIROLE 4 MG/1
4 TABLET, FILM COATED ORAL NIGHTLY
Qty: 90 TABLET | Refills: 0 | OUTPATIENT
Start: 2024-07-24

## 2024-07-24 RX ORDER — TRAZODONE HYDROCHLORIDE 100 MG/1
200 TABLET ORAL NIGHTLY
Qty: 180 TABLET | Refills: 0 | OUTPATIENT
Start: 2024-07-24

## 2024-07-24 RX ORDER — QUETIAPINE FUMARATE 25 MG/1
25 TABLET, FILM COATED ORAL 2 TIMES DAILY
Qty: 60 TABLET | Refills: 0 | OUTPATIENT
Start: 2024-07-24